# Patient Record
Sex: FEMALE | NOT HISPANIC OR LATINO | URBAN - METROPOLITAN AREA
[De-identification: names, ages, dates, MRNs, and addresses within clinical notes are randomized per-mention and may not be internally consistent; named-entity substitution may affect disease eponyms.]

---

## 2018-06-21 ENCOUNTER — IMPORTED ENCOUNTER (OUTPATIENT)
Dept: URBAN - METROPOLITAN AREA CLINIC 1 | Facility: CLINIC | Age: 48
End: 2018-06-21

## 2018-06-21 PROBLEM — H11.32: Noted: 2018-06-21

## 2018-06-21 PROBLEM — H35.033: Noted: 2018-06-21

## 2018-06-21 PROBLEM — H40.023: Noted: 2018-06-21

## 2018-06-21 PROCEDURE — 92015 DETERMINE REFRACTIVE STATE: CPT

## 2018-06-21 PROCEDURE — 92014 COMPRE OPH EXAM EST PT 1/>: CPT

## 2018-06-21 PROCEDURE — 92133 CPTRZD OPH DX IMG PST SGM ON: CPT

## 2018-06-21 NOTE — PATIENT DISCUSSION
1.  Glaucoma Suspect OU (CD: 0.45 / 0.45) -- IOP slightly elevated today at 21 OU. Positive family h/o Glaucoma. Patient is considered high risk. Condition was discussed with patient and patient understands. Will continue to monitor patient for any progression in condition. Patient was advised to call us with any problems questions or concerns. 2.  Subconjunctival Hemorrhage OS -- Reassurance given. Condition discussed with patient. 3.  GR I Hypertensive Retinopathy OU -- Stable continue HTN Control. Per patient BP recently elevated and this morning when took it was 136 / 96. Advised and stressed to patient to f/u with PCP and get BP under control. Finalized Glasses MRx & given to patient. Return for an appointment in 1 YR for a 27 / OCT OU with Dr. Valentino Huynh.

## 2019-06-21 ENCOUNTER — IMPORTED ENCOUNTER (OUTPATIENT)
Dept: URBAN - METROPOLITAN AREA CLINIC 1 | Facility: CLINIC | Age: 49
End: 2019-06-21

## 2019-06-21 PROBLEM — H40.023: Noted: 2019-06-21

## 2019-06-21 PROBLEM — H40.053: Noted: 2019-06-21

## 2019-06-21 PROBLEM — H35.033: Noted: 2019-06-21

## 2019-06-21 PROCEDURE — 92133 CPTRZD OPH DX IMG PST SGM ON: CPT

## 2019-06-21 PROCEDURE — 92014 COMPRE OPH EXAM EST PT 1/>: CPT

## 2019-06-21 PROCEDURE — 92015 DETERMINE REFRACTIVE STATE: CPT

## 2019-06-21 NOTE — PATIENT DISCUSSION
1.  Glaucoma Suspect / OHTN OU (CD: 0.50 / 0.45) -- IOP 19 / 18 today. IOP T-MAX: 21 OU. AA. Positive Family h/o Glaucoma. OCT today shows minimal thinning OD and WNL OS. Continue to observe for changes / progression. Patient is considered high risk. Condition was discussed with patient and patient understands. Will continue to monitor patient for any progression in condition. Patient was advised to call us with any problems questions or concerns. 2.  GR I Hypertensive Retinopathy OU -- Stable continue HTN Control. 3. H/o Sub-Conjunctival Hemorrhage OS -- Resolved. Finalized Glasses MRx was given to patient today. Return for an appointment in 1 YR for a 30 OU with Dr. Dami Dubois.

## 2020-08-07 ENCOUNTER — IMPORTED ENCOUNTER (OUTPATIENT)
Dept: URBAN - METROPOLITAN AREA CLINIC 1 | Facility: CLINIC | Age: 50
End: 2020-08-07

## 2020-08-07 PROBLEM — H35.033: Noted: 2020-08-07

## 2020-08-07 PROBLEM — H40.023: Noted: 2020-08-07

## 2020-08-07 PROBLEM — H40.053: Noted: 2020-08-07

## 2020-08-07 PROCEDURE — 92015 DETERMINE REFRACTIVE STATE: CPT

## 2020-08-07 PROCEDURE — 92133 CPTRZD OPH DX IMG PST SGM ON: CPT

## 2020-08-07 PROCEDURE — 92014 COMPRE OPH EXAM EST PT 1/>: CPT

## 2020-08-07 NOTE — PATIENT DISCUSSION
1.  Glaucoma Suspect / OHTN OU (CD: 0.50 / 0.45) -- IOP 19 OU today. IOP T-MAX: 21 OU. AA. Positive Family h/o Glaucoma. OCT today shows minimal thinning OD and WNL OS. Continue to observe for changes / progression. Patient is considered high risk. Condition was discussed with patient and patient understands. Will continue to monitor patient for any progression in condition. Patient was advised to call us with any problems questions or concerns. 2.  GR I Hypertensive Retinopathy OU -- Stable continue HTN Control. 3. H/o Sub-Conjunctival Hemorrhage OS -- Resolved. MRX for glasses given. Return for an appointment in 1 year 30/OCT with Dr. Lori Matos.

## 2021-08-13 ENCOUNTER — IMPORTED ENCOUNTER (OUTPATIENT)
Dept: URBAN - METROPOLITAN AREA CLINIC 1 | Facility: CLINIC | Age: 51
End: 2021-08-13

## 2021-08-13 PROBLEM — H40.053: Noted: 2021-08-13

## 2021-08-13 PROBLEM — H40.013: Noted: 2021-08-13

## 2021-08-13 PROCEDURE — 92133 CPTRZD OPH DX IMG PST SGM ON: CPT

## 2021-08-13 PROCEDURE — 99214 OFFICE O/P EST MOD 30 MIN: CPT

## 2021-08-13 NOTE — PATIENT DISCUSSION
1.  Glaucoma Suspect OU/ OHTN -- (CD: 0.50/0.45) IOP stable 19 OU today. IOP T-MAX: 21 OU. AA. Positive Family h/o Glaucoma. OCT today without progression. Continue to observe for changes/progression. Patient is considered high risk. Condition was discussed with patient and patient understands. Will continue to monitor patient for any progression in condition. Patient was advised to call us with any problems questions or concerns. 2.  GR I Hypertensive Retinopathy OU -- Stable continue HTN Control. Patient defers MRx today- May return PRN under vision plan. Return for an appointment in 1 year 30 no testing (ok to do OCT every other year) with Dr. Que Bonner.

## 2022-04-02 ASSESSMENT — TONOMETRY
OD_IOP_MMHG: 21
OS_IOP_MMHG: 18
OD_IOP_MMHG: 19
OD_IOP_MMHG: 19
OS_IOP_MMHG: 21
OS_IOP_MMHG: 19
OS_IOP_MMHG: 18
OD_IOP_MMHG: 18

## 2022-04-02 ASSESSMENT — VISUAL ACUITY
OD_SC: 20/25
OD_CC: 20/50
OS_SC: 20/25
OD_CC: J2
OS_CC: 20/20
OS_CC: 20/20-2
OD_SC: 20/25
OS_SC: 20/20
OD_CC: 20/50
OS_CC: J2
OS_CC: 20/20
OD_CC: 20/50-1

## 2023-01-23 ENCOUNTER — COMPREHENSIVE EXAM (OUTPATIENT)
Dept: URBAN - METROPOLITAN AREA CLINIC 1 | Facility: CLINIC | Age: 53
End: 2023-01-23

## 2023-01-23 DIAGNOSIS — H40.053: ICD-10-CM

## 2023-01-23 DIAGNOSIS — H40.023: ICD-10-CM

## 2023-01-23 DIAGNOSIS — H25.13: ICD-10-CM

## 2023-01-23 DIAGNOSIS — H35.033: ICD-10-CM

## 2023-01-23 PROCEDURE — 92014 COMPRE OPH EXAM EST PT 1/>: CPT

## 2023-01-23 PROCEDURE — 92015 DETERMINE REFRACTIVE STATE: CPT

## 2023-01-23 PROCEDURE — 92133 CPTRZD OPH DX IMG PST SGM ON: CPT

## 2023-01-23 ASSESSMENT — TONOMETRY
OD_IOP_MMHG: 18
OS_IOP_MMHG: 18

## 2023-01-23 ASSESSMENT — VISUAL ACUITY
OS_SC: J3
OD_SC: J5
OD_SC: 20/60
OD_CC: 20/25-2
OS_SC: 20/25
OS_CC: 20/20-1

## 2023-01-23 NOTE — PATIENT DISCUSSION
Glasses RX given to the patient today. Observe for now without intervention. The patient was advised to contact office with any change or worsening of vision.

## 2023-01-23 NOTE — PATIENT DISCUSSION
C/D: 0.5/0.45. +family history. IOP stable today at 18 OU. OCT ON performed today is WNL OU and stable. Patient is considered high risk. Condition was discussed with patient and patient understands. Will continue to monitor patient for any progression in condition. Patient was advised to call us with any problems, questions, or concerns.

## 2023-01-23 NOTE — PATIENT DISCUSSION
T-max of 21 OU. Diagnosis discussed with the patient. Will continue to monitor for change without ocular medications.

## 2024-02-14 ENCOUNTER — EMERGENCY VISIT (OUTPATIENT)
Dept: URBAN - METROPOLITAN AREA CLINIC 1 | Facility: CLINIC | Age: 54
End: 2024-02-14

## 2024-02-14 DIAGNOSIS — H10.021: ICD-10-CM

## 2024-02-14 PROCEDURE — 92012 INTRM OPH EXAM EST PATIENT: CPT

## 2024-02-14 RX ORDER — PREDNISOLONE ACETATE 10 MG/ML: 1 SUSPENSION/ DROPS OPHTHALMIC

## 2024-02-14 ASSESSMENT — TONOMETRY
OD_IOP_MMHG: 16
OS_IOP_MMHG: 16

## 2024-02-14 ASSESSMENT — VISUAL ACUITY
OS_CC: 20/20
OD_CC: 20/40

## 2024-02-28 ENCOUNTER — FOLLOW UP (OUTPATIENT)
Dept: URBAN - METROPOLITAN AREA CLINIC 1 | Facility: CLINIC | Age: 54
End: 2024-02-28

## 2024-02-28 DIAGNOSIS — H10.021: ICD-10-CM

## 2024-02-28 PROCEDURE — 92012 INTRM OPH EXAM EST PATIENT: CPT

## 2024-02-28 ASSESSMENT — VISUAL ACUITY
OD_CC: 20/20-2
OS_CC: 20/20-1

## 2024-02-28 ASSESSMENT — TONOMETRY
OS_IOP_MMHG: 14
OD_IOP_MMHG: 16

## 2025-01-23 ENCOUNTER — COMPREHENSIVE EXAM (OUTPATIENT)
Age: 55
End: 2025-01-23

## 2025-01-23 DIAGNOSIS — H40.023: ICD-10-CM

## 2025-01-23 DIAGNOSIS — H35.033: ICD-10-CM

## 2025-01-23 DIAGNOSIS — H40.053: ICD-10-CM

## 2025-01-23 DIAGNOSIS — H25.13: ICD-10-CM

## 2025-01-23 PROCEDURE — 92083 EXTENDED VISUAL FIELD XM: CPT

## 2025-01-23 PROCEDURE — 92015 DETERMINE REFRACTIVE STATE: CPT

## 2025-01-23 PROCEDURE — 92014 COMPRE OPH EXAM EST PT 1/>: CPT
